# Patient Record
Sex: FEMALE | Race: WHITE
[De-identification: names, ages, dates, MRNs, and addresses within clinical notes are randomized per-mention and may not be internally consistent; named-entity substitution may affect disease eponyms.]

---

## 2017-04-27 ENCOUNTER — HOSPITAL ENCOUNTER (EMERGENCY)
Dept: HOSPITAL 17 - PHEFT | Age: 27
Discharge: HOME | End: 2017-04-27
Payer: SELF-PAY

## 2017-04-27 VITALS
OXYGEN SATURATION: 98 % | SYSTOLIC BLOOD PRESSURE: 103 MMHG | TEMPERATURE: 97.9 F | RESPIRATION RATE: 16 BRPM | DIASTOLIC BLOOD PRESSURE: 66 MMHG | HEART RATE: 78 BPM

## 2017-04-27 VITALS — WEIGHT: 154.54 LBS | BODY MASS INDEX: 25.75 KG/M2 | HEIGHT: 65 IN
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
## 2017-04-27 DIAGNOSIS — S61.011A: Primary | ICD-10-CM

## 2017-04-27 DIAGNOSIS — J45.909: ICD-10-CM
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
## 2017-04-27 DIAGNOSIS — Z23: ICD-10-CM

## 2017-04-27 PROCEDURE — 90714 TD VACC NO PRESV 7 YRS+ IM: CPT

## 2017-04-27 PROCEDURE — 90471 IMMUNIZATION ADMIN: CPT

## 2017-04-27 PROCEDURE — 12001 RPR S/N/AX/GEN/TRNK 2.5CM/<: CPT

## 2017-04-27 NOTE — PD
HPI


Chief Complaint:  Laceration/Skin Injury


Time Seen by Provider:  13:20


Travel History


International Travel<30 days:  No


Contact w/Intl Traveler<30days:  No


Traveled to known affect area:  No





History of Present Illness


HPI


Patient comes in complaining of laceration to her right thumb palmar surface 

that occurred shortly prior to arrival.  Patient states she was scraping paint 

off windows using a razor blade when she accidentally slipped causing a 

laceration to her right thumb.  Patient rinse off with water and apply pressure 

prior to coming to the emergency department.  Patient is uncertain of her last 

tetanus shot.  Patient reports pain around site of laceration without 

radiation.  Denies any numbness or tingling.





PFSH


Past Medical History


Asthma:  Yes


Diminished Hearing:  No


Gastrointestinal Disorders:  Yes ("LIVER PROBLEMS AGE 21, NO DIAGNOSIS" PER PT 

"RESOLVED")


Immunizations Current:  Yes


Influenza Vaccination:  No


Pregnant?:  Not Pregnant


LMP:  2 weeks ago


:  0





Past Surgical History


Surgical History:  No Previous Surgery


Oral Surgery:  Yes





Social History


Alcohol Use:  Yes (RARE)


Tobacco Use:  No


Substance Use:  No





Allergies-Medications


(Allergen,Severity, Reaction):  


Coded Allergies:  


     No Known Allergies (Verified , 17)


Reported Meds & Prescriptions





Reported Meds & Active Scripts


Active


No Active Prescriptions or Reported Medications    








Review of Systems


Except as stated in HPI:  all other systems reviewed are Neg





Physical Exam


Narrative


GENERAL: Well-developed, well nourished, in no acute distress, and non-ill 

appearing.


SKIN: Laceration over right thumb distal phalanx palmar surface.  

Neurovascularly intact distally.  Full range of motion.


HEAD: Atraumatic. Normocephalic. 


EYES: Pupils equal and round. EOMI. No scleral icterus. No injection or 

drainage. 


ENT: No nasal bleeding or discharge.  Mucous membranes pink and moist.


NECK: Trachea midline. Supple.  No nuclear rigidity.


RESPIRATORY: No accessory muscle use.  No respiratory distress. 


MUSCULOSKELETAL: No obvious deformities. No clubbing.  No cyanosis.  No edema.  

Full range of motion.


NEUROLOGICAL: Awake and alert. No obvious cranial nerve deficits.  Motor 

grossly within normal limits. Normal speech.


PSYCHIATRIC: Appropriate mood and affect; insight and judgment normal.





Data


Data


Last Documented VS





Vital Signs








  Date Time  Temp Pulse Resp B/P Pulse Ox O2 Delivery O2 Flow Rate FiO2


 


17 13:06 97.9 78 16 103/66 98   








Orders





 Bupivacaine Pf 0.5% Inj (Marcaine Pf 0.5 (17 13:30)


Tetanus/Diphtheria Tox Adult (Tetanus/Di (17 13:30)








MDM


Medical Decision Making


Medical Screen Exam Complete:  Yes


Emergency Medical Condition:  Yes


Differential Diagnosis


Laceration, abrasion, contusion, other


Narrative Course


The patient suffered laceration to the thumb. There was no evidence to suggest 

foreign bodies by history and exam. Visual and tactile exams were unremarkable. 

There was no evidence of neurovascular injury. The patient had a normal distal 

vascular exam, and had full normal motor and sensory exams. There was also no 

evidence or tendon injury, with normal distal full range of motions, flexion, 

extension, abduction, adduction and opponens. There was no evidence of local 

joint space involvement at this time. The patient was irrigated with copious 

sterile normal saline and primary repair was performed. Please see procedure 

note. The patient was given signs and symptom warnings for infection, such as 

increasing pain, redness, swelling, associated heat, pus or fever. The patient 

was warned of possible unseen foreign body and instructed to return immediately 

if signs or symptoms develop. The patient was given instructions for timely 

follow up. The patient agreed with plan of care.





Patient in no obvious distress upon re-evaluation. Any questions/concerns in 

reference to patient diagnosis/condition discussed and clarified prior to 

patient's discharge. Reinforced sheer importance of close follow up with patient

's primary physician or primary care clinic. Instructed patient to return to ED 

immediately, if symptoms return/worsen. Pt showed understanding of above 

instructions.  Further instructions and recommendations were detailed in 

discharge paperwork.  Pt ambulated without difficulty out of ED at discharge.





Procedures


**Procedure Narrative**


LACERATION REPAIR


LOCATION: Right thumb distal phalanx palmar surface


LENGTH: Approximately 2 cm


NUMBER OF STITCHES/STAPLES: 5 simple interrupted





REPAIR: Verbal consent was obtained.  The area of the laceration was cleaned 

and prepped.  Digital block was performed using Marcaine without epi.  The 

wound was copiously irrigated and explored without evidence of foreign body, 

bony involvement, ligament injury, tendon injury, or neurovascular injury.  The 

wound was closed using 5-0 Vicryl. This was a single layer repair. A sterile 

dressing was applied by nurse. The patient was advised to keep the affected 

area as clean and dry as possible using soap and water.  There were no 

complications. Patient tolerated the procedure well.





Diagnosis





 Primary Impression:  


 Laceration of thumb, right


 Qualified Code:  S61.011A - Laceration of thumb, right, initial encounter


Patient Instructions:  Care For Your Absorbable Stitches (ED), Finger 

Laceration (ED), General Instructions





***Additional Instructions:


Follow-up with your primary care physician next week for reevaluation.  Use over

-the-counter Tylenol and/or ibuprofen as needed for pain.  Follow instructions 

on the packaging.  Keep wound dry and clean as possible using soap and water.  

Do not soak or submerge wound. Return to the emergency department if symptoms 

get worse.


Scripts


No Active Prescriptions or Reported Meds


Disposition:  01 DISCHARGE HOME


Condition:  Stable








Paul Hutchison 2017 13:58

## 2018-02-22 ENCOUNTER — HOSPITAL ENCOUNTER (EMERGENCY)
Dept: HOSPITAL 17 - PHED | Age: 28
Discharge: HOME | End: 2018-02-22
Payer: SELF-PAY

## 2018-02-22 VITALS — BODY MASS INDEX: 26.08 KG/M2 | HEIGHT: 65 IN | WEIGHT: 156.53 LBS

## 2018-02-22 VITALS — DIASTOLIC BLOOD PRESSURE: 62 MMHG | SYSTOLIC BLOOD PRESSURE: 110 MMHG

## 2018-02-22 VITALS
OXYGEN SATURATION: 99 % | RESPIRATION RATE: 16 BRPM | SYSTOLIC BLOOD PRESSURE: 112 MMHG | HEART RATE: 90 BPM | TEMPERATURE: 97.2 F | DIASTOLIC BLOOD PRESSURE: 65 MMHG

## 2018-02-22 DIAGNOSIS — J45.909: ICD-10-CM

## 2018-02-22 DIAGNOSIS — N73.9: ICD-10-CM

## 2018-02-22 DIAGNOSIS — A59.01: Primary | ICD-10-CM

## 2018-02-22 LAB
AMORPHOUS SEDIMENT, URINE: (no result)
COLOR UR: YELLOW
GLUCOSE UR STRIP-MCNC: (no result) MG/DL
HGB UR QL STRIP: (no result)
KETONES UR STRIP-MCNC: (no result) MG/DL
LEUKOCYTE ESTERASE UR QL STRIP: (no result) /HPF (ref 0–5)
NITRITE UR QL STRIP: (no result)
RBC #/AREA URNS HPF: (no result) /HPF (ref 0–3)
SP GR UR STRIP: 1.03 (ref 1–1.03)
SQUAMOUS #/AREA URNS HPF: > 8 /HPF (ref 0–5)
URINE LEUKOCYTE ESTERASE: (no result)

## 2018-02-22 PROCEDURE — 99283 EMERGENCY DEPT VISIT LOW MDM: CPT

## 2018-02-22 PROCEDURE — 84703 CHORIONIC GONADOTROPIN ASSAY: CPT

## 2018-02-22 PROCEDURE — 87210 SMEAR WET MOUNT SALINE/INK: CPT

## 2018-02-22 PROCEDURE — 96372 THER/PROPH/DIAG INJ SC/IM: CPT

## 2018-02-22 PROCEDURE — 87491 CHLMYD TRACH DNA AMP PROBE: CPT

## 2018-02-22 PROCEDURE — 81001 URINALYSIS AUTO W/SCOPE: CPT

## 2018-02-22 PROCEDURE — 87591 N.GONORRHOEAE DNA AMP PROB: CPT

## 2018-02-22 NOTE — PD
HPI


Chief Complaint:   Complaint


Time Seen by Provider:  16:48


Travel History


International Travel<30 days:  No


Contact w/Intl Traveler<30days:  No


Traveled to known affect area:  No





History of Present Illness


HPI


27-year-old female here for evaluation of foul-smelling vaginal discharge and 

lower abdominal cramping.  Patient reports that the symptoms have been going on 

for about a week.  She has been busy with work and has been unable to make a 

doctor's appointment during this week.  Pressure in her pelvis is moderate, 

constant, worse with movements.  She denies dysuria.  No vaginal bleeding.  Her 

formal sexual partner told her one week ago that he tested positive for HIV.  

She had unprotected sex with him about a month ago.  No history of abdominal 

surgeries.  No fevers or chills.





PFSH


Past Medical History


Asthma:  Yes


Diminished Hearing:  No


Gastrointestinal Disorders:  Yes ("LIVER PROBLEMS AGE 21, NO DIAGNOSIS" PER PT 

"RESOLVED")


Immunizations Current:  Yes


Tetanus Vaccination:  > 5 Years


Influenza Vaccination:  No


Pregnant?:  Not Pregnant


LMP:  2 WEEKS


:  0





Past Surgical History


Surgical History:  No Previous Surgery


Oral Surgery:  Yes





Social History


Alcohol Use:  Yes (occassionally )


Tobacco Use:  No


Substance Use:  No





Allergies-Medications


(Allergen,Severity, Reaction):  


Coded Allergies:  


     No Known Allergies (Verified  Allergy, Unknown, 18)


Reported Meds & Prescriptions





Reported Meds & Active Scripts


Active


No Active Prescriptions or Reported Medications    








Review of Systems


Except as stated in HPI:  all other systems reviewed are Neg





Physical Exam


Narrative


GENERAL: Well-developed, well-nourished, comfortable, no apparent distress.


SKIN: Focused skin assessment warm/dry.


HEAD: Atraumatic. Normocephalic. 


EYES: Pupils equal and round. No scleral icterus. No injection or drainage. 


ENT: Mucous membranes pink and moist.


NECK: Trachea midline. No JVD. 


CARDIOVASCULAR: Regular rate and rhythm.  No murmur appreciated.


RESPIRATORY: No accessory muscle use. Clear to auscultation. Breath sounds 

equal bilaterally. 


GASTROINTESTINAL: Abdomen soft, non-tender, nondistended. 


GYN:  Exam performed by medical student Shiela in my presence and in the 

presence of female nurse.  Normal external genitalia.  Moderate amount of 

mucopurulent vaginal discharge.  No vaginal bleeding.  Normal appearing cervix.

  No CMT.  No adnexal masses or tenderness.


MUSCULOSKELETAL: No obvious deformities. No clubbing.  No cyanosis.  No edema. 


NEUROLOGICAL: Awake and alert. No obvious cranial nerve deficits.  Motor 

grossly within normal limits. Normal speech.


PSYCHIATRIC: Appropriate mood and affect; insight and judgment normal.





Data


Data


Last Documented VS





Vital Signs








  Date Time  Temp Pulse Resp B/P (MAP) Pulse Ox O2 Delivery O2 Flow Rate FiO2


 


18 16:35 97.2 90 16 112/65 (81) 99   








Orders





 Orders


Ed Urine Pregnancytest Poc (18 16:40)


Urinalysis - C+S If Indicated (18 16:40)


Gc And Chlamydia Pcr (18 17:03)


Wet Prep Profile (18 17:03)


Azithromycin Powd Pack (Zithromax Powd P (18 17:15)


Ceftriaxone Inj (Rocephin Inj) (18 17:15)


Lidocaine 1% Inj (50 Ml) (Xylocaine 1% I (18 17:15)


Lidocaine 1% Inj (Xylocaine 1% Inj) (18 17:15)


Metronidazole (Flagyl) (18 17:30)


Lidocaine 1% Inj (Xylocaine 1% Inj) (18 17:30)





Labs





Laboratory Tests








Test


  18


16:30 18


17:00


 


Urine Collection Type CLEAN CATCH  


 


Urine Color YELLOW  


 


Urine Turbidity SLIGHT  


 


Urine pH 5.5  


 


Urine Specific Gravity 1.028  


 


Urine Protein NEG mg/dL  


 


Urine Glucose (UA) NEG mg/dL  


 


Urine Ketones NEG mg/dL  


 


Urine Occult Blood SMALL  


 


Urine Nitrite NEG  


 


Urine Bilirubin NEG  


 


Urine Leukocyte Esterase NEG  


 


Urine RBC 4-9 /hpf  


 


Urine WBC 0-2 /hpf  


 


Urine Squamous Epithelial


Cells > 8 /hpf 


  


 


 


Urine Amorphous Sediment MOD  


 


Microscopic Urinalysis Comment


  CULT NOT


INDICATED 


 


 


Urine Collection Time 1640  


 


Clue Cells (Wet Prep)  NONE SEEN 


 


Vaginal Trichomonas (Wet Prep)  PRESENT 


 


Vaginal Yeast (Wet Prep)  NONE SEEN 











MDM


Medical Decision Making


Medical Screen Exam Complete:  Yes


Emergency Medical Condition:  Yes


Differential Diagnosis


PID, BV, Trichomonas, vulvovaginal candidiasis, TOA less likely


Narrative Course


Initial vital signs show heart rate 90, blood pressure 112/65, pulse ox 99% on 

room air, oral temp of 97.2F.





UA is not suggestive of UTI.





Wet prep is positive for Trichomonas.





Urine pregnancy is negative.





Patient's physical exam is consistent with PID.  There is no CMT on exam.  She 

has mucopurulent vaginal discharge.  Her abdominal exam is benign.  I do not 

believe that there is an acute intra-abdominal/surgical process.  She was 

treated empirically with Rocephin and azithromycin and also was given 2 g of 

Flagyl for Trichomonas.  I advised that she present to the health department 

tomorrow for proper HIV testing.  She is stable for discharge home with 

outpatient follow-up.  She was informed on when to return to the emergency 

department.  She verbalizes understanding and agreement with plan.





Diagnosis





 Primary Impression:  


 Trichomonas vaginitis


 Additional Impression:  


 PID (acute pelvic inflammatory disease)


Referrals:  


Michelle Galindo MD


2 days





***Additional Instructions:  


Go to the health department tomorrow for HIV testing.


Follow-up with an OB/GYN physician this week.


Return to the emergency department for worsening symptoms or any other concerns.


Scripts


No Active Prescriptions or Reported Meds


Disposition:  01 DISCHARGE HOME


Condition:  Stable











Boogie Bravo MD 2018 17:13